# Patient Record
(demographics unavailable — no encounter records)

---

## 2024-11-19 NOTE — HISTORY OF PRESENT ILLNESS
[FreeTextEntry8] : Patient is a 47 year old M with prediabetes here to establish care and discuss:  [facility] prehealth exam noted that patient with elevated BP to the 160s currently without any chest pain, SOB, headaches, leg edema, change in vision  no prior history of elevated BP not on any medications  does note some additional stressors in life   [Spouse] : spouse

## 2024-11-19 NOTE — PLAN
[FreeTextEntry1] : Hypertension Patient with 2x episodes of elevated BP >140/90 (at [facility] office and in office today) so initiation of BP medication in office. Patient's other risk factors: elevated BMI to 30. No tobacco use. goal BP of <140/80 per JNC8 and AAFP  Plan: - discussed initiation of lisinopril and side effect profile of this medication - discussed risks of uncontrolled BP in the future - risk of stroke, MI - discussed lifestyle modifications: exercise, cutting back on salt (DASH diet) - screen for diabetes, cholesterol abn and review results at physical - BMP for baseline kidney function given initiation of lisinopril  - recommend daily BP check and to review log in 2-4 weeks  cleansed

## 2024-11-19 NOTE — PLAN
[FreeTextEntry1] : Hypertension Patient with 2x episodes of elevated BP >140/90 (at [facility] office and in office today) so initiation of BP medication in office. Patient's other risk factors: elevated BMI to 30. No tobacco use. goal BP of <140/80 per JNC8 and AAFP  Plan: - discussed initiation of lisinopril and side effect profile of this medication - discussed risks of uncontrolled BP in the future - risk of stroke, MI - discussed lifestyle modifications: exercise, cutting back on salt (DASH diet) - screen for diabetes, cholesterol abn and review results at physical - BMP for baseline kidney function given initiation of lisinopril  - recommend daily BP check and to review log in 2-4 weeks

## 2024-12-24 NOTE — HISTORY OF PRESENT ILLNESS
[de-identified] : Patient is a 47 year old M with HTN here for f/u:  HTN started the lisinopril, tolerating well BP at home all within goal (130s/80s, never above 140/90) started juicing more  discussed cutting back on salt, switching out beef for salmon/fish discussed 30/week of fast paced walking  no chest pain, headache, SOB, leg edema Prediabetes goal - less rice with most meals, will cut down to every other day

## 2024-12-24 NOTE — PLAN
[FreeTextEntry1] : HTN Chronic, controlled on lisinopril 10mg daily. no chest pain, sob, headaches - intractable plan - refill of medication as needed - continue to monitor 3x/week - return to clinic in 3 months  - dietary changes: check packages for sodium levels to keep limited - exercise goals: 30 minutes weekly (aerobic)  Prediabetes A1c of 6.1 in November. Plan - dietary changes to make: cut back on rice to every other day, switching up with other grains   Low HDL HDL of 29 which is fairly low discussed increasing consumption of foods high in omega 3s, e.g. salmon / mackerel to replace beef in diet

## 2024-12-24 NOTE — PHYSICAL EXAM
[Normal Sclera/Conjunctiva] : normal sclera/conjunctiva [Normal Outer Ear/Nose] : the outer ears and nose were normal in appearance [Normal] : normal rate, regular rhythm, normal S1 and S2 and no murmur heard [Grossly Normal Strength/Tone] : grossly normal strength/tone [No Focal Deficits] : no focal deficits [Normal Gait] : normal gait [Normal Affect] : the affect was normal [Normal Insight/Judgement] : insight and judgment were intact

## 2024-12-24 NOTE — HEALTH RISK ASSESSMENT
[Monthly or less (1 pt)] : Monthly or less (1 point) [No falls in past year] : Patient reported no falls in the past year [0] : 1) Little interest or pleasure doing things: Not at all (0) [Never] : Never